# Patient Record
Sex: FEMALE | Race: WHITE | Employment: PART TIME | URBAN - METROPOLITAN AREA
[De-identification: names, ages, dates, MRNs, and addresses within clinical notes are randomized per-mention and may not be internally consistent; named-entity substitution may affect disease eponyms.]

---

## 2022-08-09 ENCOUNTER — INITIAL CONSULT (OUTPATIENT)
Dept: CARDIOLOGY CLINIC | Age: 22
End: 2022-08-09
Payer: COMMERCIAL

## 2022-08-09 VITALS
SYSTOLIC BLOOD PRESSURE: 126 MMHG | WEIGHT: 103.2 LBS | DIASTOLIC BLOOD PRESSURE: 60 MMHG | HEART RATE: 100 BPM | HEIGHT: 65 IN | BODY MASS INDEX: 17.19 KG/M2

## 2022-08-09 DIAGNOSIS — R55 SYNCOPE, UNSPECIFIED SYNCOPE TYPE: Primary | ICD-10-CM

## 2022-08-09 PROCEDURE — 99203 OFFICE O/P NEW LOW 30 MIN: CPT | Performed by: INTERNAL MEDICINE

## 2022-08-09 PROCEDURE — 93000 ELECTROCARDIOGRAM COMPLETE: CPT | Performed by: INTERNAL MEDICINE

## 2022-08-09 RX ORDER — SERTRALINE HYDROCHLORIDE 25 MG/1
25 TABLET, FILM COATED ORAL DAILY
COMMUNITY
Start: 2022-07-07

## 2022-08-09 RX ORDER — DROSPIRENONE AND ETHINYL ESTRADIOL 0.02-3(28)
1 KIT ORAL DAILY
COMMUNITY

## 2022-08-09 ASSESSMENT — ENCOUNTER SYMPTOMS
NAUSEA: 1
VISUAL CHANGE: 1

## 2022-08-09 NOTE — PROGRESS NOTES
Use Topics    Alcohol use: Not Currently       ROS:    Review of Systems   Cardiovascular:  Positive for syncope. Gastrointestinal:  Positive for nausea. Neurological:  Positive for dizziness and light-headedness. PHYSICAL EXAM:   /60   Pulse 100   Ht 5' 5\" (1.651 m)   Wt 103 lb 3.2 oz (46.8 kg)   BMI 17.17 kg/m²      Physical Exam  Constitutional:       Appearance: Normal appearance. HENT:      Head: Normocephalic and atraumatic. Nose: Nose normal.   Eyes:      Extraocular Movements: Extraocular movements intact. Pupils: Pupils are equal, round, and reactive to light. Neck:      Vascular: No carotid bruit. Cardiovascular:      Rate and Rhythm: Regular rhythm. Pulses: Normal pulses. Heart sounds: No murmur heard. Pulmonary:      Effort: Pulmonary effort is normal.      Breath sounds: Normal breath sounds. Abdominal:      General: Abdomen is flat. Bowel sounds are normal.      Palpations: Abdomen is soft. Musculoskeletal:         General: Normal range of motion. Cervical back: Normal range of motion and neck supple. Skin:     General: Skin is warm and dry. Neurological:      General: No focal deficit present. Mental Status: She is alert and oriented to person, place, and time. Psychiatric:         Mood and Affect: Mood normal.       Medical problems and test results were reviewed with the patient today. Results for orders placed or performed in visit on 08/09/22   EKG 12 lead    Impression    Sinus  Rhythm   Low voltage in precordial leads. ABNORMAL                ASSESSMENT and [address] was seen today for loss of consciousness. Diagnoses and all orders for this visit:    Syncope  -     EKG 12 lead  -     Extended cardiac holter monitor (48 hrs - 15 days); Future      Disposition:  Return for Follow up after procedure. Thank you for allowing me to participate in this patient's care.   Please call or contact me if there are any questions or concerns regarding the above.       Ami Berkowitz MD  08/09/22  3:08 PM

## 2022-08-16 ENCOUNTER — NURSE ONLY (OUTPATIENT)
Dept: CARDIOLOGY CLINIC | Age: 22
End: 2022-08-16

## 2022-08-16 DIAGNOSIS — R55 SYNCOPE: Primary | ICD-10-CM

## 2022-08-16 NOTE — PROGRESS NOTES
Pt arrived today for a 14 day zio monitor. Placed monitor and instructed pt on use and how to return the monitor.  Pt voiced understanding

## 2022-09-12 ENCOUNTER — TELEPHONE (OUTPATIENT)
Dept: CARDIOLOGY CLINIC | Age: 22
End: 2022-09-12

## 2022-09-13 NOTE — TELEPHONE ENCOUNTER
Spoke with Dr. Sejal Jorgensen, he stated that the monitor showed that the patient had rare PAC's and PVC's. Instructed patient of Dr. Ji Brow response. She stated that she still experiences palpitations.  Made an appointment for her to follow up with Dr. Sejal Jorgensen on 9/27 to discuss her monitor and being put on a betablocker

## 2022-09-27 ENCOUNTER — OFFICE VISIT (OUTPATIENT)
Dept: CARDIOLOGY CLINIC | Age: 22
End: 2022-09-27

## 2022-09-27 VITALS
SYSTOLIC BLOOD PRESSURE: 98 MMHG | DIASTOLIC BLOOD PRESSURE: 58 MMHG | BODY MASS INDEX: 17.66 KG/M2 | HEIGHT: 65 IN | WEIGHT: 106 LBS | HEART RATE: 80 BPM

## 2022-09-27 DIAGNOSIS — R55 SYNCOPE, UNSPECIFIED SYNCOPE TYPE: Primary | ICD-10-CM

## 2022-09-27 ASSESSMENT — ENCOUNTER SYMPTOMS
NAUSEA: 0
BOWEL INCONTINENCE: 0
SPUTUM PRODUCTION: 0
SHORTNESS OF BREATH: 0
ORTHOPNEA: 0
HEMATEMESIS: 0
BLURRED VISION: 0
SORE THROAT: 0
SWOLLEN GLANDS: 0
DIARRHEA: 0
WHEEZING: 0
VISUAL CHANGE: 0
HEMATOCHEZIA: 0
CHANGE IN BOWEL HABIT: 0
VOMITING: 0
ABDOMINAL PAIN: 0
COUGH: 0
HOARSE VOICE: 0
COLOR CHANGE: 0

## 2022-09-27 NOTE — PROGRESS NOTES
[facility]  [address], [address] [facility], [address] [place] [address]  PHONE: [phone]        22        NAME:  Mark White  : 2000  MRN: [id number]       SUBJECTIVE:   Mark White is a 20 y.o. female seen for a follow up visit regarding the following: The patient was seen last month due to unexplained syncope. Follow up 2 week ambulatory ECG showed NSR with an average HR of 88 bpm and rare PAC's and PVC's. She returns for follow up. I discussed monitor results with the patient. Chief Complaint   Patient presents with    Palpitations     Discuss beta blocker       HPI:    Dizziness  This is a recurrent problem. The problem has been resolved. Pertinent negatives include no abdominal pain, anorexia, arthralgias, change in bowel habit, chest pain, chills, congestion, coughing, diaphoresis, fatigue, fever, headaches, joint swelling, myalgias, nausea, neck pain, numbness, rash, sore throat, swollen glands, urinary symptoms, vertigo, visual change, vomiting or weakness. Past Medical History, Past Surgical History, Family history, Social History, and Medications were all reviewed with the patient today and updated as necessary. Current Outpatient Medications:     sertraline (ZOLOFT) 25 MG tablet, Take 25 mg by mouth in the morning., Disp: , Rfl:     drospirenone-ethinyl estradiol (SHAHEED) 3-0.02 MG per tablet, Take 1 tablet by mouth in the morning., Disp: , Rfl:   Allergies   Allergen Reactions    Cefdinir Hives     History reviewed. No pertinent past medical history. Past Surgical History:   Procedure Laterality Date    WISDOM TOOTH EXTRACTION       History reviewed. No pertinent family history. Social History     Tobacco Use    Smoking status: Never    Smokeless tobacco: Never   Substance Use Topics    Alcohol use: Not Currently       ROS:    Review of Systems   Constitutional: Negative for chills, decreased appetite, diaphoresis, fatigue, fever and malaise/fatigue.    HENT: Negative for congestion, hearing loss, hoarse voice, nosebleeds and sore throat. Eyes:  Negative for blurred vision. Cardiovascular:  Negative for chest pain, claudication, cyanosis, dyspnea on exertion, irregular heartbeat, leg swelling, near-syncope, orthopnea, palpitations, paroxysmal nocturnal dyspnea and syncope. Respiratory:  Negative for cough, shortness of breath, sputum production and wheezing. Endocrine: Negative for polydipsia, polyphagia and polyuria. Skin:  Negative for color change and rash. Musculoskeletal:  Negative for arthralgias, joint swelling, myalgias and neck pain. Gastrointestinal:  Negative for abdominal pain, anorexia, change in bowel habit, bowel incontinence, diarrhea, hematemesis, hematochezia, nausea and vomiting. Genitourinary:  Negative for dysuria, frequency and hematuria. Neurological:  Positive for dizziness. Negative for focal weakness, headaches, light-headedness, loss of balance, numbness, sensory change, vertigo and weakness. Psychiatric/Behavioral:  Negative for altered mental status and memory loss. PHYSICAL EXAM:   BP (!) 98/58   Pulse 80   Ht 5' 5\" (1.651 m)   Wt 106 lb (48.1 kg)   BMI 17.64 kg/m²      Physical Exam  Constitutional:       Appearance: Normal appearance. HENT:      Head: Normocephalic and atraumatic. Nose: Nose normal.   Eyes:      Extraocular Movements: Extraocular movements intact. Pupils: Pupils are equal, round, and reactive to light. Neck:      Vascular: No carotid bruit. Cardiovascular:      Rate and Rhythm: Regular rhythm. Pulses: Normal pulses. Heart sounds: No murmur heard. Pulmonary:      Effort: Pulmonary effort is normal.      Breath sounds: Normal breath sounds. Abdominal:      General: Abdomen is flat. Bowel sounds are normal.      Palpations: Abdomen is soft. Musculoskeletal:         General: Normal range of motion. Cervical back: Normal range of motion and neck supple. Skin:     General: Skin is warm and dry. Neurological:      General: No focal deficit present. Mental Status: She is alert and oriented to person, place, and time. Psychiatric:         Mood and Affect: Mood normal.       Medical problems and test results were reviewed with the patient today. No results found for this or any previous visit (from the past 672 hour(s)). ASSESSMENT and [address] was seen today for palpitations. Diagnoses and all orders for this visit:    Syncope, unspecified syncope type:Resolved. I suspect her syncope may be due to symptomatic hypotension. I encouraged her to consume at least 64 oz of fluid daily. Be aware position changes that can influence BP,etc.Try to manage with lifestyle changes. Disposition:    Return if symptoms worsen or fail to improve.                 Alexei Nelson MD  9/27/2022  1:49 PM